# Patient Record
(demographics unavailable — no encounter records)

---

## 2025-02-13 NOTE — HISTORY OF PRESENT ILLNESS
[FreeTextEntry1] : Pt is a 49 yo P1 with ovarian cyst presenting for consult for desired surgical management. Reports went to [facility] ED a few days ago and [facility] ED last night and both times was discharged after torsion work up and found unlikely to be torsed. Reports she is having pain in office today and is upset that surgical scheduling outpatient will take several weeks to coordinate. Denies severe pain today. Reports has some pelvic pain and upper back pain. Has rheumatologic condition and reports feels that she is having flare at this time. Denies fevers or chills or nausea/vomiting.   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 Evaluate for torsion.  LMP: 2025  Pregnancy status: Unknown  COMPARISON: None.  TECHNIQUE: Endovaginal and transabdominal pelvic sonogram. Color and Spectral Doppler was performed.  FINDINGS:  Uterus: 9.0 cm x 5.5 cm x 5.1 cm.  section scar noted. Endometrium: 11 mm. Within normal limits.  Right ovary: 5.6 cm x 5.1 cm x 4.7 cm. Moderately enlarged secondary to avascular unilocular cystic lesion with homogeneous low-level internal echoes measuring 5.7 x 5.0 x 5.1 cm, most consistent with endometrioma. Normal arterial and venous waveforms. Left ovary: 3.5 cm x 2.3 cm x 3.3 cm. Normal in size and appearance. Dominant 2.4 cm follicle versus small simple cyst noted. Normal arterial and venous waveforms.  Fluid: None.  IMPRESSION: Right ovarian 5.7 cm cystic lesion most consistent with endometrioma. Preserved flow to ovary at time of exam, however intermittent torsion cannot be entirely excluded.

## 2025-02-13 NOTE — PLAN
[FreeTextEntry1] : 51 yo P1 with ovarian cyst presenting for consult for desired surgical management. Discussed given pts age, surgical history, and active rheum flare would not recommend surgical removal at this time unless clear evidence of likely torsion. VSS, benign exam with no concern for torsion at time of eval.  - Discussed given new ovarian cyst at pts age, would recommend tumor markers to assess risk of malignancy.  - Discussed surgical removal can be scheduled as outpatient surgical procedure but that pt needs clearance from PCP and Rheumatology prior to surgical booking  - Discussed R/B/A to removal including bleeding, infection, damage to surrounding structures, possible need for laparotomy, possible nerve injury, possible anesthesia complication. Discussed given her age, if tumor markers abnormal would recommend pt be referred to specialist (MIGS vs Onc) for management.   Surgical plan pending clearance and work up of adnexal mass. Pt aware if she experiences severe pain in interval period should return to ED for r/o torsion.   >45 min spent reviewing ED records, evaluating and counseling pt, and documenting visit.

## 2025-02-13 NOTE — PLAN
[FreeTextEntry1] : 49 yo P1 with ovarian cyst presenting for consult for desired surgical management. Discussed given pts age, surgical history, and active rheum flare would not recommend surgical removal at this time unless clear evidence of likely torsion. VSS, benign exam with no concern for torsion at time of eval.  - Discussed given new ovarian cyst at pts age, would recommend tumor markers to assess risk of malignancy.  - Discussed surgical removal can be scheduled as outpatient surgical procedure but that pt needs clearance from PCP and Rheumatology prior to surgical booking  - Discussed R/B/A to removal including bleeding, infection, damage to surrounding structures, possible need for laparotomy, possible nerve injury, possible anesthesia complication. Discussed given her age, if tumor markers abnormal would recommend pt be referred to specialist (MIGS vs Onc) for management.   Surgical plan pending clearance and work up of adnexal mass. Pt aware if she experiences severe pain in interval period should return to ED for r/o torsion.   >45 min spent reviewing ED records, evaluating and counseling pt, and documenting visit.

## 2025-02-13 NOTE — PHYSICAL EXAM
[Appropriately responsive] : appropriately responsive [Alert] : alert [No Acute Distress] : no acute distress [Oriented x3] : oriented x3 [FreeTextEntry4] : Clinically well perfused [FreeTextEntry5] : No increased work of breathing  [FreeTextEntry7] : Soft, nontender, nondistended, no rebound or guarding

## 2025-02-13 NOTE — HISTORY OF PRESENT ILLNESS
[FreeTextEntry1] : Pt is a 51 yo P1 with ovarian cyst presenting for consult for desired surgical management. Reports went to [facility] [place] a few days ago and [facility] ED last night and both times was discharged after torsion work up and found unlikely to be torsed. Reports she is having pain in office today and is upset that surgical scheduling outpatient will take several weeks to coordinate. Denies severe pain today. Reports has some pelvic pain and upper back pain. Has rheumatologic condition and reports feels that she is having flare at this time. Denies fevers or chills or nausea/vomiting.   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; occasional alcohol use; denies tobacco, drug use  TVUS ED [facility]:   PROCEDURE DATE:  2025    INTERPRETATION:  CLINICAL INFORMATION: Right ovarian cyst. Evaluate for torsion.  LMP: 2025  Pregnancy status: Unknown  COMPARISON: None.  TECHNIQUE: Endovaginal and transabdominal pelvic sonogram. Color and Spectral Doppler was performed.  FINDINGS:  Uterus: 9.0 cm x 5.5 cm x 5.1 cm.  section scar noted. Endometrium: 11 mm. Within normal limits.  Right ovary: 5.6 cm x 5.1 cm x 4.7 cm. Moderately enlarged secondary to avascular unilocular cystic lesion with homogeneous low-level internal echoes measuring 5.7 x 5.0 x 5.1 cm, most consistent with endometrioma. Normal arterial and venous waveforms. Left ovary: 3.5 cm x 2.3 cm x 3.3 cm. Normal in size and appearance. Dominant 2.4 cm follicle versus small simple cyst noted. Normal arterial and venous waveforms.  Fluid: None.  IMPRESSION: Right ovarian 5.7 cm cystic lesion most consistent with endometrioma. Preserved flow to ovary at time of exam, however intermittent torsion cannot be entirely excluded.

## 2025-03-28 NOTE — HISTORY OF PRESENT ILLNESS
[FreeTextEntry1] : 51 yr old patient presents for right ovarian cyst consultation. Patient states the cyst is getting larger. Patient complains of severe pelvic pain since February 2025. Patient stated she lost 20 lbs in the last month due to the pain. LMP today.  Patient has been on an anti-inflammatory diet x 2 months. Hx of Sjogren's.  ER visit 3/13 for extreme pelvic pain, she had 3 ED visit since the pain started.  She is also having memory issues and flare of her autoimmune conditions.   History gathering was challenging

## 2025-03-28 NOTE — REASON FOR VISIT
[Consultation] : consultation for [FreeTextEntry2] : Ovarian cyst  [FreeTextEntry1] : Dr. Olya Weston

## 2025-03-28 NOTE — CONSULT LETTER
[Dear  ___] : Dear ~TYRON, [Consult Letter:] : I had the pleasure of evaluating your patient, [unfilled]. [( Thank you for referring [unfilled] for consultation for _____ )] : Thank you for referring [unfilled] for consultation for [unfilled] [Please see my note below.] : Please see my note below. [Consult Closing:] : Thank you very much for allowing me to participate in the care of this patient.  If you have any questions, please do not hesitate to contact me. [FreeTextEntry2] : Olya Weston MD [address] [place] [address] [FreeTextEntry3] : Tani Graham MD, FACOG, FACS Minimally Invasive Gynecologic Surgery [address] [address] Tel: [phone] Fax: [phone]

## 2025-03-28 NOTE — DISCUSSION/SUMMARY
[FreeTextEntry1] : I sat down with the patient to discuss the imaging findings & her symptoms which warrant intervention. I explained that the pelvic pain is likely due to presumed diagnosis of endometriosis and ovarian cyst. We discussed medication for management of endometriosis hormones such as changing oral contraceptive pill, NuvaRing, LNG IUD, Depo Provera, Orillisa and Depo Lupron and pain management with NSAIDs, and surgical intervention with oophorectomy, cystectomy, and removal of implants.  The options for surgical approach including open, vaginal, and laparoscopic with or without robotic assistance. She understands there is a small chance of malignancy with complex cyst and elevated  and may need second surgery if cystectomy if preformed. She does not want her ovary removed.   The differential diagnosis was discussed in detail. The indications, risks, benefits and alternatives were discussed. but not limited to, conversion to laparotomy, bleeding, infection, injury to surrounding organs was discussed at length. Chance of occult injury and need for future surgery. BORIS YUEN understands that there is increased risk with endometriosis surgery due to nature of disease to cause adhesions and inflammation. BORIS YUEN expressed an understanding of the treatment rationale and her questions were answered to her apparent satisfaction.  BORIS YUEN was given written information about endometriosis and surgery, diagrams of pelvic anatomy.  All questions and concerns addressed, she expressed understanding. She was given a detailed list of endometriosis related complication published by [facility]. Yes

## 2025-03-28 NOTE — CONSULT LETTER
[Dear  ___] : Dear ~TYRON, [Consult Letter:] : I had the pleasure of evaluating your patient, [unfilled]. [( Thank you for referring [unfilled] for consultation for _____ )] : Thank you for referring [unfilled] for consultation for [unfilled] [Please see my note below.] : Please see my note below. [Consult Closing:] : Thank you very much for allowing me to participate in the care of this patient.  If you have any questions, please do not hesitate to contact me. [FreeTextEntry2] : Olya Weston MD [address] [place] [address] [FreeTextEntry3] : Tani Graham MD, FACOG, FACS Minimally Invasive Gynecologic Surgery [address] [address] Tel: [phone] Fax: [phone]

## 2025-03-28 NOTE — DISCUSSION/SUMMARY
[FreeTextEntry1] : I sat down with the patient to discuss the imaging findings & her symptoms which warrant intervention. I explained that the pelvic pain is likely due to presumed diagnosis of endometriosis and ovarian cyst. We discussed medication for management of endometriosis hormones such as changing oral contraceptive pill, NuvaRing, LNG IUD, Depo Provera, Orillisa and Depo Lupron and pain management with NSAIDs, and surgical intervention with oophorectomy, cystectomy, and removal of implants.  The options for surgical approach including open, vaginal, and laparoscopic with or without robotic assistance. She understands there is a small chance of malignancy with complex cyst and elevated  and may need second surgery if cystectomy if preformed. She does not want her ovary removed.   The differential diagnosis was discussed in detail. The indications, risks, benefits and alternatives were discussed. but not limited to, conversion to laparotomy, bleeding, infection, injury to surrounding organs was discussed at length. Chance of occult injury and need for future surgery. BORIS YUEN understands that there is increased risk with endometriosis surgery due to nature of disease to cause adhesions and inflammation. BORIS YUEN expressed an understanding of the treatment rationale and her questions were answered to her apparent satisfaction.  BORIS YUEN was given written information about endometriosis and surgery, diagrams of pelvic anatomy.  All questions and concerns addressed, she expressed understanding. She was given a detailed list of endometriosis related complication published by [facility] [place].

## 2025-05-30 NOTE — HISTORY OF PRESENT ILLNESS
[FreeTextEntry1] : 52 yo patient presents for pre-surgical consultation for LAPAROSCOPIC OVARIAN CYSTECTOMY TREATMENT OF ENDOMETRIOSIS PERITONEAL BIOPSIES scheduled for 06/06/25.

## 2025-05-30 NOTE — DISCUSSION/SUMMARY
[FreeTextEntry1] : The differential diagnosis was discussed in detail. The indications, risks, benefits and alternatives were discussed. but not limited to, conversion to laparotomy, bleeding, infection, injury to surrounding organs was discussed at length. Chance of occult injury and need for future surgery. BORIS YUEN understands that there is increased risk with endometriosis surgery due to nature of disease to cause adhesions and inflammation. BORIS YUEN expressed an understanding of the treatment rationale and her questions were answered to her apparent satisfaction. BORIS YUEN was given written information about endometriosis and surgery, diagrams of pelvic anatomy. All questions and concerns addressed, she expressed understanding.    Effort for the visit includes the note prep, review of prior material, interview, exam, further documentation, and coordination of care.

## 2025-06-18 NOTE — REASON FOR VISIT
[Consultation] : consultation for [FreeTextEntry2] : Ovarian cyst  [FreeTextEntry1] : Dr. Olya Weston  Frequent PVCs

## 2025-06-24 NOTE — DISCUSSION/SUMMARY
[FreeTextEntry1] : 51 year old s/p Laparoscopic right ovarian cystectomy, hysteroscopy, polypectomy, and dilation and curettage presents for postoperative visit doing well -given copy of pathology and operative report -resume all activity without limitation -resume routine gyn care  -follow up 1 month

## 2025-06-24 NOTE — PHYSICAL EXAM
[Appropriately responsive] : appropriately responsive [Alert] : alert [No Acute Distress] : no acute distress [Oriented x3] : oriented x3 [Soft] : soft [Non-tender] : non-tender [FreeTextEntry7] : incisions clean, dry, intact